# Patient Record
Sex: FEMALE | Race: ASIAN | NOT HISPANIC OR LATINO | URBAN - METROPOLITAN AREA
[De-identification: names, ages, dates, MRNs, and addresses within clinical notes are randomized per-mention and may not be internally consistent; named-entity substitution may affect disease eponyms.]

---

## 2023-04-09 ENCOUNTER — EMERGENCY (EMERGENCY)
Age: 63
LOS: 1 days | Discharge: HOME | End: 2023-04-09
Attending: STUDENT IN AN ORGANIZED HEALTH CARE EDUCATION/TRAINING PROGRAM | Admitting: STUDENT IN AN ORGANIZED HEALTH CARE EDUCATION/TRAINING PROGRAM
Payer: MEDICAID

## 2023-04-09 VITALS
TEMPERATURE: 98 F | RESPIRATION RATE: 15 BRPM | DIASTOLIC BLOOD PRESSURE: 84 MMHG | SYSTOLIC BLOOD PRESSURE: 127 MMHG | OXYGEN SATURATION: 100 % | HEART RATE: 84 BPM

## 2023-04-09 VITALS
DIASTOLIC BLOOD PRESSURE: 78 MMHG | SYSTOLIC BLOOD PRESSURE: 122 MMHG | OXYGEN SATURATION: 100 % | HEART RATE: 83 BPM | TEMPERATURE: 98 F | RESPIRATION RATE: 16 BRPM

## 2023-04-09 LAB
ALBUMIN SERPL ELPH-MCNC: 4.4 G/DL — SIGNIFICANT CHANGE UP (ref 3.3–5)
ALP SERPL-CCNC: 51 U/L — SIGNIFICANT CHANGE UP (ref 40–120)
ALT FLD-CCNC: 13 U/L — SIGNIFICANT CHANGE UP (ref 4–33)
ANION GAP SERPL CALC-SCNC: 13 MMOL/L — SIGNIFICANT CHANGE UP (ref 7–14)
APPEARANCE UR: CLEAR — SIGNIFICANT CHANGE UP
AST SERPL-CCNC: 18 U/L — SIGNIFICANT CHANGE UP (ref 4–32)
BASOPHILS # BLD AUTO: 0.02 K/UL — SIGNIFICANT CHANGE UP (ref 0–0.2)
BASOPHILS NFR BLD AUTO: 0.3 % — SIGNIFICANT CHANGE UP (ref 0–2)
BILIRUB SERPL-MCNC: 0.4 MG/DL — SIGNIFICANT CHANGE UP (ref 0.2–1.2)
BILIRUB UR-MCNC: NEGATIVE — SIGNIFICANT CHANGE UP
BUN SERPL-MCNC: 22 MG/DL — SIGNIFICANT CHANGE UP (ref 7–23)
CALCIUM SERPL-MCNC: 9.5 MG/DL — SIGNIFICANT CHANGE UP (ref 8.4–10.5)
CHLORIDE SERPL-SCNC: 102 MMOL/L — SIGNIFICANT CHANGE UP (ref 98–107)
CO2 SERPL-SCNC: 23 MMOL/L — SIGNIFICANT CHANGE UP (ref 22–31)
COLOR SPEC: SIGNIFICANT CHANGE UP
CREAT SERPL-MCNC: 1.11 MG/DL — SIGNIFICANT CHANGE UP (ref 0.5–1.3)
D DIMER BLD IA.RAPID-MCNC: <150 NG/ML DDU — SIGNIFICANT CHANGE UP
DIFF PNL FLD: NEGATIVE — SIGNIFICANT CHANGE UP
EGFR: 56 ML/MIN/1.73M2 — LOW
EOSINOPHIL # BLD AUTO: 0.19 K/UL — SIGNIFICANT CHANGE UP (ref 0–0.5)
EOSINOPHIL NFR BLD AUTO: 2.8 % — SIGNIFICANT CHANGE UP (ref 0–6)
GLUCOSE SERPL-MCNC: 97 MG/DL — SIGNIFICANT CHANGE UP (ref 70–99)
GLUCOSE UR QL: NEGATIVE — SIGNIFICANT CHANGE UP
HCT VFR BLD CALC: 40.5 % — SIGNIFICANT CHANGE UP (ref 34.5–45)
HGB BLD-MCNC: 13.6 G/DL — SIGNIFICANT CHANGE UP (ref 11.5–15.5)
IANC: 4.56 K/UL — SIGNIFICANT CHANGE UP (ref 1.8–7.4)
IMM GRANULOCYTES NFR BLD AUTO: 0.3 % — SIGNIFICANT CHANGE UP (ref 0–0.9)
KETONES UR-MCNC: NEGATIVE — SIGNIFICANT CHANGE UP
LEUKOCYTE ESTERASE UR-ACNC: NEGATIVE — SIGNIFICANT CHANGE UP
LYMPHOCYTES # BLD AUTO: 1.43 K/UL — SIGNIFICANT CHANGE UP (ref 1–3.3)
LYMPHOCYTES # BLD AUTO: 21.4 % — SIGNIFICANT CHANGE UP (ref 13–44)
MCHC RBC-ENTMCNC: 28.5 PG — SIGNIFICANT CHANGE UP (ref 27–34)
MCHC RBC-ENTMCNC: 33.6 GM/DL — SIGNIFICANT CHANGE UP (ref 32–36)
MCV RBC AUTO: 84.7 FL — SIGNIFICANT CHANGE UP (ref 80–100)
MONOCYTES # BLD AUTO: 0.45 K/UL — SIGNIFICANT CHANGE UP (ref 0–0.9)
MONOCYTES NFR BLD AUTO: 6.7 % — SIGNIFICANT CHANGE UP (ref 2–14)
NEUTROPHILS # BLD AUTO: 4.56 K/UL — SIGNIFICANT CHANGE UP (ref 1.8–7.4)
NEUTROPHILS NFR BLD AUTO: 68.5 % — SIGNIFICANT CHANGE UP (ref 43–77)
NITRITE UR-MCNC: NEGATIVE — SIGNIFICANT CHANGE UP
NRBC # BLD: 0 /100 WBCS — SIGNIFICANT CHANGE UP (ref 0–0)
NRBC # FLD: 0 K/UL — SIGNIFICANT CHANGE UP (ref 0–0)
NT-PROBNP SERPL-SCNC: 142 PG/ML — SIGNIFICANT CHANGE UP
PH UR: 7 — SIGNIFICANT CHANGE UP (ref 5–8)
PLATELET # BLD AUTO: 291 K/UL — SIGNIFICANT CHANGE UP (ref 150–400)
POTASSIUM SERPL-MCNC: 4.4 MMOL/L — SIGNIFICANT CHANGE UP (ref 3.5–5.3)
POTASSIUM SERPL-SCNC: 4.4 MMOL/L — SIGNIFICANT CHANGE UP (ref 3.5–5.3)
PROT SERPL-MCNC: 7.7 G/DL — SIGNIFICANT CHANGE UP (ref 6–8.3)
PROT UR-MCNC: NEGATIVE — SIGNIFICANT CHANGE UP
RBC # BLD: 4.78 M/UL — SIGNIFICANT CHANGE UP (ref 3.8–5.2)
RBC # FLD: 13.2 % — SIGNIFICANT CHANGE UP (ref 10.3–14.5)
SODIUM SERPL-SCNC: 138 MMOL/L — SIGNIFICANT CHANGE UP (ref 135–145)
SP GR SPEC: 1.01 — LOW (ref 1.01–1.05)
TROPONIN T, HIGH SENSITIVITY RESULT: 10 NG/L — SIGNIFICANT CHANGE UP
TROPONIN T, HIGH SENSITIVITY RESULT: 9 NG/L — SIGNIFICANT CHANGE UP
TSH SERPL-MCNC: 2.15 UIU/ML — SIGNIFICANT CHANGE UP (ref 0.27–4.2)
UROBILINOGEN FLD QL: SIGNIFICANT CHANGE UP
WBC # BLD: 6.67 K/UL — SIGNIFICANT CHANGE UP (ref 3.8–10.5)
WBC # FLD AUTO: 6.67 K/UL — SIGNIFICANT CHANGE UP (ref 3.8–10.5)

## 2023-04-09 PROCEDURE — 99285 EMERGENCY DEPT VISIT HI MDM: CPT

## 2023-04-09 PROCEDURE — 93010 ELECTROCARDIOGRAM REPORT: CPT

## 2023-04-09 PROCEDURE — 71046 X-RAY EXAM CHEST 2 VIEWS: CPT | Mod: 26

## 2023-04-09 NOTE — ED ADULT NURSE NOTE - OBJECTIVE STATEMENT
A7Ox4. ambulatory. c/o palpitations. NAD. pt denies SOB, chest pain, dizziness, weakness, urinary symptoms, HA, n/v/d, fevers, chills. respirations are even and un labored. skin intact. 20g placed to LAC. labs drawn and sent. EKG obtained. safety precautions maintained.

## 2023-04-09 NOTE — ED PROVIDER NOTE - CLINICAL SUMMARY MEDICAL DECISION MAKING FREE TEXT BOX
This is a 63-year-old female status post laparoscopic nephrectomy right side, possibly due to cancer?  Presented to the ED complaining having palpitations.  Patient states that she feels like her heart has been beating fast for the last 1 to 2 days. SOB, palpitations? samira r/o ARIN vs. CKD and r/o CHF. WIll also do Ddimer to r/o PE.

## 2023-04-09 NOTE — ED PROVIDER NOTE - OBJECTIVE STATEMENT
This is a 63-year-old female status post laparoscopic nephrectomy right side, possibly due to cancer?  Presented to the ED complaining having palpitations.  Patient states that she feels like her heart has been beating fast for the last 1 to 2 days.  She states that she also has shortness of breath which is not normal for 2 weeks.  She denies having any leg swelling recent travel calf swelling chest pain.  She says that the shortness of breath does not worsen with ambulation or rest and nor does it worsen when she lays down flat.  She did have a nuclear stress test done in November 22 which was normal.  She also went to [facility] 2 weeks ago for similar complaint and had a negative work-up patient was started on Macrobid for a urinary tract infection at that time.

## 2023-04-09 NOTE — ED PROVIDER NOTE - NS ED ATTENDING STATEMENT MOD
This was a shared visit with the YANELI. I reviewed and verified the documentation and independently performed the documented:

## 2023-04-09 NOTE — ED PROVIDER NOTE - PATIENT PORTAL LINK FT
You can access the FollowMyHealth Patient Portal offered by [facility] by registering at the following website: [email or web address]. By joining [facility]’s FollowMyHealth portal, you will also be able to view your health information using other applications (apps) compatible with our system.

## 2023-04-09 NOTE — ED PROVIDER NOTE - ATTENDING APP SHARED VISIT CONTRIBUTION OF CARE
I have personally performed a face to face medical and diagnostic evaluation of the patient. I have discussed with and reviewed the YANELI's note and agree with the History, ROS, Physical Exam and MDM unless otherwise indicated. A brief summary of my personal evaluation and impression can be found below.    Leila TALAVERA: 63-year-old female, with a history of prior nephrectomy secondary to early kidney cancer?  Performed in [place] 2 years ago presents with a chief complaint of shortness of breath associated with palpitations that been going on for the last 1 to 2 days, patient is a very limited historian son attempting to clarify at bedside, patient reports was seen at a outside hospital for similar complaints and was diagnosed with a urinary tract infection and completed a course of Macrobid, there is no chest pain no shortness of breath is constant nonexertional no fever no nausea no vomiting no cough no chest pain no abdominal pain no urinary or bowel complaints she had a stress test in November 2022 she did not know the results but did not indicate there was a problem related to it, no new lower extremity swelling or edema.    All other ROS negative, except as above and as per HPI and ROS section.    VITALS: Initial triage and subsequent vitals have been reviewed by me.  GEN APPEARANCE: Alert, non-toxic, well-appearing, NAD.  HEAD: Atraumatic.  EYES: PERRLa, EOMI, vision grossly intact.   NECK: Supple  CV: RRR, S1S2, no c/r/m/g. Cap refill <2 seconds. No bruits.   LUNGS: CTAB. No abnormal breath sounds.  ABDOMEN: Soft, NTND. No guarding or rebound.   MSK/EXT: No spinal or extremity point tenderness. No CVA ttp. Pelvis stable. No peripheral edema.  NEURO: Alert, follows commands. Weight bearing normal. Speech normal. Sensation and motor normal x4 extremities.   SKIN: Warm, dry and intact. No rash.  PSYCH: Appropriate    Plan/MDM: Exam vital signs stable nontoxic-appearing very well-appearing with physical as above, EKG is grossly reassuring there is a sinus arrhythmia intervals normal and there is no indication of acute ischemia, unclear etiology of patient's symptoms given the vagueness and unclear medical history we will check labs EKG cardiac enzymes dimer thyroid we will arrange for echo give meds as needed and reassess dispo accordingly pending study results.

## 2023-04-09 NOTE — ED PROVIDER NOTE - NSFOLLOWUPINSTRUCTIONS_ED_ALL_ED_FT
Rest, drink plenty of fluids.  Advance activity as tolerated.  Continue all previously prescribed medications as directed.  Follow up with your primary care physician in 48-72 hours- bring copies of your results.  Return to the ER for worsening or persistent symptoms, and/or ANY NEW OR CONCERNING SYMPTOMS. If you have issues obtaining follow up, please call: [phone] to obtain a doctor or specialist who takes your insurance in your area.  You may call [phone] to make an appointment with the internal medicine clinic.

## 2023-04-10 LAB
CULTURE RESULTS: SIGNIFICANT CHANGE UP
SPECIMEN SOURCE: SIGNIFICANT CHANGE UP

## 2023-04-12 PROCEDURE — 93308 TTE F-UP OR LMTD: CPT | Mod: 26
